# Patient Record
Sex: FEMALE | Race: WHITE
[De-identification: names, ages, dates, MRNs, and addresses within clinical notes are randomized per-mention and may not be internally consistent; named-entity substitution may affect disease eponyms.]

---

## 2014-04-23 VITALS
DIASTOLIC BLOOD PRESSURE: 65 MMHG | SYSTOLIC BLOOD PRESSURE: 150 MMHG | DIASTOLIC BLOOD PRESSURE: 65 MMHG | SYSTOLIC BLOOD PRESSURE: 150 MMHG

## 2017-03-22 ENCOUNTER — HOSPITAL ENCOUNTER (OUTPATIENT)
Dept: HOSPITAL 24 - RAD | Age: 77
End: 2017-03-22
Attending: SPECIALIST
Payer: COMMERCIAL

## 2017-03-22 DIAGNOSIS — Z12.31: Primary | ICD-10-CM

## 2017-03-22 PROCEDURE — 77067 SCR MAMMO BI INCL CAD: CPT

## 2017-03-23 NOTE — MG
HISTORY:  SCREENING 



Comparison: September 23, 2014 and February 10, 2016



FINDINGS: 



Bilateral CC and MLO projections of the right and left breast were obtained.  Scattered fibroglandul
ar tissue is seen to be present without significant interval change.  No suspicious architectural di
stortion, mass or clustered microcalcifications can be observed to suggest malignancy.  No skin thic
kening or nipple retraction is appreciated.   No pathological lymphadenopathy can be identified. Arjun
ign-appearing calcifications are noted within the right and left breast. A biopsy clip is noted on t
he right.

 

IMPRESSION:  NO RADIOGRAPHIC EVIDENCE OF MALIGNANCY.  

 

ACR CATEGORY 2 - benign findings.

 

FOLLOW-UP EXAM 1 YEAR. 



Diagnostic CAD was utilized and reviewed.



* 0 (ZERO) - ASSESSMENT INCOMPLETE; ADDITIONAL IMAGING IS NEEDED. 

* 1/1 (ONE) - NEGATIVE. 

* 2/II (TWO) - BENIGN FINDINGS.

 * 3/III (THREE) - PROBABLY BENIGN FINDING; SHORT INTERVAL FOLLOW-UP

SUGGESTED.

 * 4/IV (FOUR) - SUSPICIOUS ABNORMALITY; BIOPSY SHOULD BE CONSIDERED.

 * 5/V (FIVE) - HIGHLY SUSPICIOUS OF MALIGNANCY; BIOPSY SHOULD BE PERFORMED.

 

A NEGATIVE X-RAY REPORT SHOULD NOT DELAY BIOPSY IF A DOMINANT OR

CLINICALLY SUSPICIOUS MASS IS PRESENT; 4 TO 8 PERCENT OF CANCERS ARE NOT IDENTIFIED BY X-RAY.  A NEG
ATIVE REPORT MAY REINFORCE THE CLINICAL IMPRESSION.  ADENOSIS AND DENSE BREASTS MAY OBSCURE AN UNDER
LYING NEOPLASM.





Reported By:Electronically Signed by KIRA POLLOCK MD at 3/23/2017 3:09:17 PM

## 2017-04-17 ENCOUNTER — HOSPITAL ENCOUNTER (OUTPATIENT)
Dept: HOSPITAL 24 - RAD | Age: 77
End: 2017-04-17
Attending: FAMILY MEDICINE
Payer: COMMERCIAL

## 2017-04-17 DIAGNOSIS — R05: Primary | ICD-10-CM

## 2017-04-17 PROCEDURE — 71020: CPT

## 2017-04-18 NOTE — RAD
HISTORY:  Chronic cough



Study: Chest two-view



Comparison: CT chest June 24, 2016



Findings:



The trachea is midline.  The cardiac silhouette is upper limits normal in size. The left ventricle i
s prominent..  The lungs are clear without focal infiltrate or effusion.  The bony thorax is unremar
kable with the exception of multilevel degenerative disc disease in the thoracic spine..  



IMPRESSION: 

 

1.  No acute cardiopulmonary disease.





Reported By:Electronically Signed by COLLETTE BUTTS MD at 4/18/2017 6:45:08 AM

## 2018-03-27 ENCOUNTER — HOSPITAL ENCOUNTER (OUTPATIENT)
Dept: HOSPITAL 24 - ER | Age: 78
Setting detail: OBSERVATION
LOS: 2 days | Discharge: HOME HEALTH | End: 2018-03-29
Attending: INTERNAL MEDICINE | Admitting: INTERNAL MEDICINE
Payer: COMMERCIAL

## 2018-03-27 VITALS — BODY MASS INDEX: 28 KG/M2

## 2018-03-27 DIAGNOSIS — R55: Primary | ICD-10-CM

## 2018-03-27 DIAGNOSIS — R06.02: ICD-10-CM
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
## 2018-03-27 DIAGNOSIS — E87.1: ICD-10-CM

## 2018-03-27 DIAGNOSIS — R94.31: ICD-10-CM

## 2018-03-27 DIAGNOSIS — R94.4: ICD-10-CM

## 2018-03-27 DIAGNOSIS — R26.89: ICD-10-CM

## 2018-03-27 DIAGNOSIS — R79.1: ICD-10-CM

## 2018-03-27 DIAGNOSIS — M25.552: ICD-10-CM

## 2018-03-27 DIAGNOSIS — I10: ICD-10-CM

## 2018-03-27 DIAGNOSIS — M25.551: ICD-10-CM

## 2018-03-27 DIAGNOSIS — R53.1: ICD-10-CM

## 2018-03-27 LAB
ALBUMIN SERPL BCP-MCNC: 3 G/DL (ref 3.4–5)
ALP SERPL-CCNC: 109 UNITS/L (ref 46–116)
ALT SERPL W P-5'-P-CCNC: 35 UNITS/L (ref 12–78)
AST SERPL W P-5'-P-CCNC: 44 UNITS/L (ref 15–37)
BASOPHILS # BLD AUTO: 0.1 X10^3/UL (ref 0–0.1)
BASOPHILS NFR BLD AUTO: 0.6 % (ref 0.2–1)
BILIRUB UR QL STRIP.AUTO: NEGATIVE
BUN SERPL-MCNC: 17 MG/DL (ref 7–18)
CALCIUM ALBUM COR SERPL-SCNC: (no result) MMOL/L (ref 136–145)
CALCIUM ALBUM COR SERPL-SCNC: 8.9 MG/DL (ref 8.5–10.1)
CALCIUM SERPL-MCNC: 8.1 MG/DL (ref 8.5–10.1)
CHLORIDE SERPL-SCNC: 96 MMOL/L (ref 98–107)
CK MB SERPL-MCNC: 1 NG/ML (ref 0–4)
CK SERPL-CCNC: 105 UNITS/L (ref 26–192)
CKMB %: 1 % (ref ?–4)
CLARITY UR: CLEAR
CO2 SERPL-SCNC: 25 MMOL/L (ref 21–32)
COLOR UR AUTO: YELLOW
CREAT SERPL-MCNC: 1.37 MG/DL (ref 0.55–1.02)
EGFR  BLACK RACES: 48 (ref 60–?)
EOSINOPHIL # BLD AUTO: 0 X10^3/UL (ref 0–0.2)
EOSINOPHIL NFR BLD AUTO: 0.2 % (ref 0.9–2.9)
ERYTHROCYTE [DISTWIDTH] IN BLOOD BY AUTOMATED COUNT: 16.4 % (ref 11.6–16.5)
GLUCOSE UR QL STRIP.AUTO: NEGATIVE
HCT VFR BLD AUTO: 35.4 % (ref 36–47)
HGB BLD-MCNC: 11.5 G/DL (ref 12–16)
KETONES UR QL STRIP.AUTO: NEGATIVE
LEUKOCYTE ESTERASE UR QL STRIP.AUTO: NEGATIVE
LYMPHOCYTES # BLD AUTO: 1.1 X10^3/UL (ref 1.3–2.9)
LYMPHOCYTES NFR BLD AUTO: 7.8 % (ref 21–51)
MAGNESIUM SERPL-MCNC: 1.7 MG/DL (ref 1.7–2.9)
MCH RBC QN AUTO: 26.9 PG (ref 27–34)
MCHC RBC AUTO-ENTMCNC: 32.5 G/DL (ref 33–35)
MCV RBC AUTO: 82.9 FL (ref 80–100)
MONOCYTES # BLD AUTO: 1.1 X10^3/UL (ref 0.3–0.8)
MONOCYTES NFR BLD AUTO: 7.4 % (ref 0–13)
NEUTROPHILS # BLD AUTO: 12.1 X10^3/UL (ref 2.2–4.8)
NEUTROPHILS NFR BLD AUTO: 84 % (ref 42–75)
NITRITE UR QL STRIP.AUTO: NEGATIVE
PH UR STRIP.AUTO: 6 [PH] (ref 5–8)
PLATELET # BLD: 261 X10^3/UL (ref 150–450)
PMV BLD AUTO: 9 FL (ref 7.4–11)
PROT SERPL-MCNC: 7 G/DL (ref 6.4–8.2)
PROT UR QL STRIP.AUTO: NEGATIVE
RBC # BLD AUTO: 4.28 X10^6/UL (ref 3.5–5.4)
RBC # UR STRIP.AUTO: NEGATIVE /UL
SODIUM SERPL-SCNC: 131 MMOL/L (ref 136–145)
SP GR UR STRIP.AUTO: 1.01 (ref 1–1.03)
TROPONIN I SERPL-MCNC: < 0.02 NG/ML (ref 0–1.5)
UROBILINOGEN UR QL STRIP.AUTO: NORMAL
WBC NRBC COR # BLD AUTO: 14.4 X10^3/UL (ref 3.6–10)

## 2018-03-27 PROCEDURE — 82553 CREATINE MB FRACTION: CPT

## 2018-03-27 PROCEDURE — 93005 ELECTROCARDIOGRAM TRACING: CPT

## 2018-03-27 PROCEDURE — 70450 CT HEAD/BRAIN W/O DYE: CPT

## 2018-03-27 PROCEDURE — 73521 X-RAY EXAM HIPS BI 2 VIEWS: CPT

## 2018-03-27 PROCEDURE — 84484 ASSAY OF TROPONIN QUANT: CPT

## 2018-03-27 PROCEDURE — G0378 HOSPITAL OBSERVATION PER HR: HCPCS

## 2018-03-27 PROCEDURE — 85025 COMPLETE CBC W/AUTO DIFF WBC: CPT

## 2018-03-27 PROCEDURE — 71275 CT ANGIOGRAPHY CHEST: CPT

## 2018-03-27 PROCEDURE — 71045 X-RAY EXAM CHEST 1 VIEW: CPT

## 2018-03-27 PROCEDURE — 85378 FIBRIN DEGRADE SEMIQUANT: CPT

## 2018-03-27 PROCEDURE — 96365 THER/PROPH/DIAG IV INF INIT: CPT

## 2018-03-27 PROCEDURE — 81003 URINALYSIS AUTO W/O SCOPE: CPT

## 2018-03-27 PROCEDURE — 80053 COMPREHEN METABOLIC PANEL: CPT

## 2018-03-27 PROCEDURE — 82550 ASSAY OF CK (CPK): CPT

## 2018-03-27 PROCEDURE — 36415 COLL VENOUS BLD VENIPUNCTURE: CPT

## 2018-03-27 PROCEDURE — 83735 ASSAY OF MAGNESIUM: CPT

## 2018-03-27 PROCEDURE — 99284 EMERGENCY DEPT VISIT MOD MDM: CPT

## 2018-03-27 RX ADMIN — SODIUM CHLORIDE SCH MLS/HR: 9 INJECTION, SOLUTION INTRAVENOUS at 23:37

## 2018-03-27 NOTE — CT
HISTORY: Hypertensive status post fall.



Study:  CT brain without contrast



Comparison: None.



Technique:

Multiple axial images of the brain were obtained from the skull base to the vertex without administra
tion of IV contrast.  Dose reduction techniques including Automated Exposure Control (AEC) and adjust
ment of mA and kV were utilized.





Findings: 



Age-related cortical atrophy and chronic small vessel ischemic changes. No acute intraparenchymal hem
orrhage or mass can be identified.  No extra-axial fluid collections are seen.  No alteration in the 
attenuation of the brain parenchyma can be identified to suggest acute or subacute ischemic change.  
The ventricular system is symmetric and nondilated.  The extracranial structures are grossly unremark
able.



IMPRESSION: No obvious acute intracranial pathology. If clinical concern exists for acute ischemia/in
farction, MRI brain is more sensitive.



Reported By:Electronically Signed by INES OJEDA MD at 3/27/2018 7:26:07 PM

## 2018-03-27 NOTE — RAD
HISTORY:  Bilateral hip pain status post fall.



Study: Two views of the bilateral hips.



Comparison: None.



Findings:



Mild osteoarthritis of the bilateral hips. Degenerative changes are also seen of the bilateral SI lucas
nts and visualized lumbar spine. No acute cortical disruption or dislocation can be identified.  No s
ignificant soft tissue swelling or injury can be seen.  



IMPRESSION: No acute osseous abnormality.





Reported By:Electronically Signed by INES OJEDA MD at 3/27/2018 8:57:08 PM

## 2018-03-27 NOTE — RAD
AP chest. Indication: Hypertension with fall. Comparison: 04/17/2017. Findings: There is mild scolios
is of the thoracic spine with curvature of the right. Heart size is mildly enlarged. Chronic intersti
tial lung change are noted without focal airspace opacity, pleural effusion or pneumothorax. No acute
 osseous abnormality. Moderate-size sliding hiatal hernia. 



Impression:

1.Stable cardiomegaly and chronic interstitial lung changes without acute airspace disease or CHF.



2. Moderate-sized hiatal hernia.



Reported By:Electronically Signed by GRACE BALDWIN MD at 3/27/2018 10:06:08 PM

## 2018-03-27 NOTE — DR.GENAD
HPI





- PCP


Primary Care Physician: bryon





- Complaint/Symptoms


Chief Complaint Doctors Comments: Patient presents to the ED via EMS with 

complaint that she fell while at home. Upon of EMS her BP systolic was 46. She 

was given bolus of NS BP in /57. Patient is alert in no acute distress. 

She denies a history of cardiac disease. She complained of bilateral hip pain. 

Family member reporst that patient has a history of dyspnea.


Chief Complaint:: "low bp feeling weak"





- Source


History Provided: Patient





- Mode of Arrival


Mode of Arrival: EMS





- Timing


Onset of Chief Complaint: 03/27/18





PMH





- PMH


Past Medical History: Yes


Past Medical History: Arthritis, Hypertension


Past Surgical History: Yes





- Family History


History of Family Medical Conditions: Yes


Family Medical History: Hypertension





- Social History


Does patient currently use any type of tobacco product: No


Have you used tobacco products in the last 12 months: No


Type of Tobacco Use: None


Does any household member use tobacco: No


Alcohol Use: None


Do you use any recreational Drugs:: No


Lives With: Family


Lives Where: Home





- infectious screening


In the last 2 months have you had wt loss of >10#?: NO


Have you had fever, night sweats or hemotysis?: No


Have you traveled outside the country in the last 6 months?: No


Isolation: Standard





ROS





- Review of Systems


Eyes: No Symptoms Reported


ENTM: No Symptoms Reported


Respiratoy: No Symptoms Reported


Cardiovascular: No Symptoms Reported


Gastrointestinal/Abdominal: No Symptoms Reported


Genitourinary: No Symptoms Reported


Neurological: No Symptoms Reported


Musculoskeletal: No Symptoms Reported


Integumentary: No Symptoms Reported


Hematologic/Lymphatic: No Symptoms Reported


Endocrine: No Symptoms Reported


Psychiatric: No Symptoms Reported


All Other Systems: Reviewed and Negative





PE





- Vital Signs


Vitals: 


 





Temperature                      98.1 F


Pulse Rate [Apical]              92


Pulse Rate                       86


Respiratory Rate                 24


Blood Pressure [Right Arm]       112/57


Blood Pressure                   123/57


O2 Sat by Pulse Oximetry         97











- General


Limitations: No Limitations


General Appearance: Alert, In No Apparent Distress





- Head


Head Exam: Normal Inspection, Atraumatic





- Eyes


Eye exam: Normal Appearance, PERRL, EOMI





- ENT


ENT Exam: Normal Exam


External Ear Exam: Normal External Inspection


TM/Canal Exam: Bilateral Normal


Nose Exam: Normal Nose Exam


Mouth Exam: Normal Inspection


Throat Exam: Normal Inspection





- Neck


Neck Exam: Normal Inspection, Full ROM





- Chest


Chest Inspection: Normal Inspection, Symmetric Chest Wall Rise





- Respiratory


Respiratory Exam: Normal Lung Sounds Bilat


Respiratory Exam: Bilateral Clear to Auscultation





- Cardiovascular


Cardiovascular Exam: Regular Rate, Normal Rhythm





- Abdominal Exam


Abdominal Exam: Normal Inspection, Normal Bowel Sounds


Abdominal Tenderness: negative: RUQ, RLQ, LUQ, LLQ, Epigastrium, Suprapubic, 

Diffuse, Mild, Moderate, Severe, Other





- Extremities


Extremities Exam: Normal Inspection, Full ROM





- Back


Back Exam: Normal Inspection





- Neurologic


Neurological Exam: Alert, Oriented X3, CN II-XII Intact





- Psychiatric


Psychiatric Exam: Normal Affect, Normal Mood





Course





- Reevaluation


1st: Improved





- Consultation


Called: 21:40 (Dr Devi agreed to admit for further evaluation)





ROR





- Labs Reviewed


Laboratory Results Reviewed?: Yes (low sodiuma, D Dimer 612 )


Result Diagrams: 


 03/27/18 18:43





 03/27/18 18:43


Laboratory: 


 











WBC  14.4 X10^3/uL (3.6-10.0)  H  03/27/18  18:43    


 


RBC  4.28 X10^6/uL (3.5-5.4)   03/27/18  18:43    


 


Hgb  11.5 g/dL (12.0-16.0)  L  03/27/18  18:43    


 


Hct  35.4 % (36.0-47.0)  L  03/27/18  18:43    


 


MCV  82.9 fL (80.0-100.0)   03/27/18  18:43    


 


MCH  26.9 pg (27.0-34.0)  L  03/27/18  18:43    


 


MCHC  32.5 g/dL (33.0-35.0)  L  03/27/18  18:43    


 


RDW  16.4 % (11.6-16.5)   03/27/18  18:43    


 


Plt Count  261 X10^3/uL (150.0-450.0)   03/27/18  18:43    


 


MPV  9.0 fL (7.4-11.0)   03/27/18  18:43    


 


Neut % (Auto)  84.0 % (42.0-75.0)  H  03/27/18  18:43    


 


Lymph % (Auto)  7.8 % (21.0-51.0)  L  03/27/18  18:43    


 


Mono % (Auto)  7.4 % (0.0-13.0)   03/27/18  18:43    


 


Eos % (Auto)  0.2 % (0.9-2.9)  L  03/27/18  18:43    


 


Baso % (Auto)  0.6 % (0.2-1.0)   03/27/18  18:43    


 


Neut # (Auto)  12.1 x10^3/uL (2.2-4.8)  H  03/27/18  18:43    


 


Lymph # (Auto)  1.1 X10^3/uL (1.3-2.9)  L  03/27/18  18:43    


 


Mono # (Auto)  1.1 x10^3/uL (0.3-0.8)  H  03/27/18  18:43    


 


Eos # (Auto)  0.0 x10^3/uL (0.0-0.2)   03/27/18  18:43    


 


Baso # (Auto)  0.1 X10^3/uL (0.0-0.1)   03/27/18  18:43    


 


Absolute Nucleated RBC  0.0 /100WBC  03/27/18  18:43    


 


D-Dimer  612 ng/mL (0-400)  H*  03/27/18  18:43    


 


Sodium  131 mmol/L (136-145)  L  03/27/18  18:43    


 


Corrected Sodium  TNP   03/27/18  18:43    


 


Potassium  3.9 mmol/L (3.5-5.1)   03/27/18  18:43    


 


Chloride  96 mmol/L ()  L  03/27/18  18:43    


 


Carbon Dioxide  25.0 mmol/L (21-32)   03/27/18  18:43    


 


BUN  17 mg/dL (7-18)   03/27/18  18:43    


 


Creatinine  1.37 mg/dL (0.55-1.02)  H  03/27/18  18:43    


 


Est GFR (MDRD) Af Amer  48  (>60)  L  03/27/18  18:43    


 


Est GFR (MDRD) Non-Af  39  (>60)  L  03/27/18  18:43    


 


Glucose  105 mg/dL (65-99)  H  03/27/18  18:43    


 


Calcium  8.1 mg/dL (8.5-10.1)  L  03/27/18  18:43    


 


Corrected Calcium  8.9 mg/dL (8.5-10.1)   03/27/18  18:43    


 


Magnesium  1.7 mg/dL (1.7-2.9)   03/27/18  18:43    


 


Total Bilirubin  0.50 mg/dL (0.2-1.0)   03/27/18  18:43    


 


AST  44 Units/L (15-37)  H  03/27/18  18:43    


 


ALT  35 Units/L (12-78)   03/27/18  18:43    


 


Alkaline Phosphatase  109 Units/L ()   03/27/18  18:43    


 


Creatine Kinase  105 Units/L ()   03/27/18  18:43    


 


CK-MB (CK-2)  1.0 ng/mL (0-4.0)   03/27/18  18:43    


 


CK/CKMB % Calc  1.0 % (<4)   03/27/18  18:43    


 


Troponin I  < 0.02 ng/mL (0-1.5)   03/27/18  18:43    


 


Total Protein  7.0 g/dL (6.4-8.2)   03/27/18  18:43    


 


Albumin  3.0 g/dL (3.4-5.0)  L  03/27/18  18:43    


 


Globulin  4.0 g/dL (2.5-4.5)   03/27/18  18:43    


 


Albumin/Globulin Ratio  0.8 Ratio (1.1-2.1)  L  03/27/18  18:43    














- XRAY


XRAY Interpreted by: Radiologist (CT Brain w/o: Age related cortical atrophy 

and chronic small vessel ischemic changes.. No acute intraparenchymal 

hemorrhage or mass can be identified. If clinical concern exists for acute 

ischemia/infarction, MRI brain is more sensitive.)





- Diagnosis


Discharge Problem: 


 Hyponatremia, D-dimer, elevated





Syncope


Qualifiers:


 Syncope type: unspecified Qualified Code(s): R55 - Syncope and collapse





Dyspnea


Qualifiers:


 Dyspnea type: shortness of breath Qualified Code(s): R06.02 - Shortness of 

breath; R06.00 - Dyspnea, unspecified; R06.01 - Orthopnea








- Discharge Plan


Condition: Stable





- Follow ups/Referrals


Follow ups/Referrals: 


RUDY SIDHU [Primary Care Provider] - 3 days





- Instructions

## 2018-03-28 LAB
ALBUMIN SERPL BCP-MCNC: 2.6 G/DL (ref 3.4–5)
ALP SERPL-CCNC: 97 UNITS/L (ref 46–116)
ALT SERPL W P-5'-P-CCNC: 30 UNITS/L (ref 12–78)
AST SERPL W P-5'-P-CCNC: 27 UNITS/L (ref 15–37)
BASOPHILS # BLD AUTO: 0 X10^3/UL (ref 0–0.1)
BASOPHILS NFR BLD AUTO: 0.3 % (ref 0.2–1)
BUN SERPL-MCNC: 11 MG/DL (ref 7–18)
CALCIUM ALBUM COR SERPL-SCNC: (no result) MMOL/L (ref 136–145)
CALCIUM ALBUM COR SERPL-SCNC: 9.1 MG/DL (ref 8.5–10.1)
CALCIUM SERPL-MCNC: 8 MG/DL (ref 8.5–10.1)
CHLORIDE SERPL-SCNC: 100 MMOL/L (ref 98–107)
CO2 SERPL-SCNC: 26.6 MMOL/L (ref 21–32)
CREAT SERPL-MCNC: 0.86 MG/DL (ref 0.55–1.02)
EGFR  BLACK RACES: > 60 (ref 60–?)
EOSINOPHIL # BLD AUTO: 0 X10^3/UL (ref 0–0.2)
EOSINOPHIL NFR BLD AUTO: 0.3 % (ref 0.9–2.9)
ERYTHROCYTE [DISTWIDTH] IN BLOOD BY AUTOMATED COUNT: 16.4 % (ref 11.6–16.5)
HCT VFR BLD AUTO: 32.1 % (ref 36–47)
HGB BLD-MCNC: 10.7 G/DL (ref 12–16)
LYMPHOCYTES # BLD AUTO: 1.7 X10^3/UL (ref 1.3–2.9)
LYMPHOCYTES NFR BLD AUTO: 13.5 % (ref 21–51)
MCH RBC QN AUTO: 27.4 PG (ref 27–34)
MCHC RBC AUTO-ENTMCNC: 33.3 G/DL (ref 33–35)
MCV RBC AUTO: 82.2 FL (ref 80–100)
MONOCYTES # BLD AUTO: 0.9 X10^3/UL (ref 0.3–0.8)
MONOCYTES NFR BLD AUTO: 7.2 % (ref 0–13)
NEUTROPHILS # BLD AUTO: 9.7 X10^3/UL (ref 2.2–4.8)
NEUTROPHILS NFR BLD AUTO: 78.7 % (ref 42–75)
PLATELET # BLD: 243 X10^3/UL (ref 150–450)
PMV BLD AUTO: 9.3 FL (ref 7.4–11)
PROT SERPL-MCNC: 6.4 G/DL (ref 6.4–8.2)
RBC # BLD AUTO: 3.9 X10^6/UL (ref 3.5–5.4)
SODIUM SERPL-SCNC: 135 MMOL/L (ref 136–145)
WBC NRBC COR # BLD AUTO: 12.3 X10^3/UL (ref 3.6–10)

## 2018-03-28 RX ADMIN — GABAPENTIN SCH MG: 300 CAPSULE ORAL at 15:13

## 2018-03-28 RX ADMIN — CARVEDILOL SCH MG: 6.25 TABLET, FILM COATED ORAL at 09:27

## 2018-03-28 RX ADMIN — CELECOXIB SCH MG: 100 CAPSULE ORAL at 09:27

## 2018-03-28 RX ADMIN — SODIUM CHLORIDE SCH: 9 INJECTION, SOLUTION INTRAMUSCULAR; INTRAVENOUS; SUBCUTANEOUS at 06:06

## 2018-03-28 RX ADMIN — GABAPENTIN SCH MG: 300 CAPSULE ORAL at 06:33

## 2018-03-28 RX ADMIN — LANSOPRAZOLE SCH MG: 30 CAPSULE, DELAYED RELEASE ORAL at 07:45

## 2018-03-28 RX ADMIN — DOCUSATE SODIUM SCH MG: 100 CAPSULE, LIQUID FILLED ORAL at 06:33

## 2018-03-28 RX ADMIN — MAGNESIUM HYDROXIDE SCH ML: 400 SUSPENSION ORAL at 06:33

## 2018-03-28 RX ADMIN — SODIUM CHLORIDE SCH ML: 9 INJECTION, SOLUTION INTRAMUSCULAR; INTRAVENOUS; SUBCUTANEOUS at 22:00

## 2018-03-28 RX ADMIN — CELECOXIB SCH MG: 100 CAPSULE ORAL at 21:06

## 2018-03-28 RX ADMIN — LOSARTAN POTASSIUM AND HYDROCHLOROTHIAZIDE SCH TAB: 50; 12.5 TABLET, FILM COATED ORAL at 09:27

## 2018-03-28 RX ADMIN — SODIUM CHLORIDE SCH MLS/HR: 9 INJECTION, SOLUTION INTRAVENOUS at 12:48

## 2018-03-28 RX ADMIN — GABAPENTIN SCH MG: 300 CAPSULE ORAL at 21:06

## 2018-03-28 RX ADMIN — SODIUM CHLORIDE SCH: 9 INJECTION, SOLUTION INTRAMUSCULAR; INTRAVENOUS; SUBCUTANEOUS at 15:13

## 2018-03-28 RX ADMIN — CARVEDILOL SCH MG: 6.25 TABLET, FILM COATED ORAL at 21:05

## 2018-03-28 RX ADMIN — MAGNESIUM HYDROXIDE SCH: 400 SUSPENSION ORAL at 21:29

## 2018-03-28 RX ADMIN — DOCUSATE SODIUM SCH: 100 CAPSULE, LIQUID FILLED ORAL at 21:29

## 2018-03-28 RX ADMIN — LANSOPRAZOLE SCH MG: 30 CAPSULE, DELAYED RELEASE ORAL at 21:06

## 2018-03-28 NOTE — CT
HISTORY: Elevated D-dimer. Rule out PE.



Study: CT chest with contrast



Comparison: Chest x-ray dated March 27, 2018.



Technique: Multiple axial images of the chest were obtained from the thoracic inlet to the upper abdo
men after the administration of IV contrast. MIP images were obtained. Dose reduction techniques incl
uding Automated Exposure Control (AEC) and adjustment of mA and kV were utilized.



Findings:



The mediastinum does not demonstrate significant pathological lymphadenopathy.  There is no paracardi
al effusion observed.  The thoracic aorta is normal in its contour without evidence for aneurysmal di
latation.  The central pulmonary arterial system does not demonstrate central filling defects to sugg
est pulmonary emboli. Cardiomegaly. Stents are seen within the left anterior descending coronary tonya
ry.



Biapical scarring. Bibasilar scarring versus atelectasis. No obvious pulmonary nodule, mass, pleural 
effusion, focal consolidation, or pneumothorax. Moderate-sized hiatal hernia. Remaining upper abdomin
al structures are unremarkable. Moderate to severe degenerative changes of the spine. No aggressive o
sseous lesions.



IMPRESSION:

 

1.  No CT evidence of acute thoracic pathology or pulmonary embolus.



2. Other chronic findings as above.





Reported By:Electronically Signed by INES OJEDA MD at 3/28/2018 3:55:08 PM

## 2018-03-28 NOTE — DR.H&P
H&P





- History & Physical for Day of:


H&P Date: 03/27/18





- Chief Complaint


Chief Complaint: FALL, GENERALIZED WEAKNESS, SHORT OF BREATH





- Allergies


Allergies/Adverse Reactions: 


 Allergies











Allergy/AdvReac Type Severity Reaction Status Date / Time


 


codeine Allergy   Verified 03/27/18 21:24














- History of Present Illness


History of Present Illness:  is a 78 year old patient of  

who presented to the emergency room via EMS with reports of a fall at home.  

Patient reports generalized weakness and dizziness throughout the day. EMS 

reports that patients systolic blood pressure was in the 40s on their 

arrival. Patient states her blood pressure has been decreased recently. She 

also reports bilateral hip pain following the fall. Medical History includes: 

Cataracts, Hypertension, IBS, Constipation, Muscle Weakness, Arthritis, DDD, 

Depression, and Dyspnea.  On arrival to the emergency room, vitals were 98.1, 86

, 16, 99% 2L NC, 123/57. Labs were obtained. Abnormal Labs include the following

: WBC 14.4, Hgb 11.5, Hct 35.4, MCH 26.9, MCHC 32.5, D-Dimer 612, Sodium 131, 

Chloride 96, Creatinine 1.37, GFR af 48, GFR non 39, Glucose 105, Calcium 8.1, 

AST 44, Albumin 3.0, A/G Ratio 0.8. EKG revealed: Sinus Rhythm, Rate=86. Hip X-

Ray reports: No acute osseous abnormality. A Chest X-Ray was obtained and 

reported: Stable cardiomegaly and chronic interstitial lung changes without 

acute airspace disease or CHF.  Moderate-sized hiatal hernia.  Brain CT reported

: No obvious acute intracranial pathology.  If clinical concern exists for 

acute ischemia/infarction, MRI brain is more sensitive. Patient admitted to the 

hospital as observation for a syncopal episode, hyponatremia, and to rule out a 

pulmonary embolism. We plan to obtain a chest CT with contrast in the morning 

and will follow up with am labs.





- Past Medical History


Past Medical History: Arthritis, Hypertension





- Past Surgical History


Surgical History: Hysterectomy, Tonsillectomy





- Family History


Family Medical History: Hypertension





- Social History


Does patient currently use any type of tobacco product: No


Have you used tobacco products in the last 12 months: No


Type of Tobacco Use: None


Does any household member use tobacco: No


Alcohol Use: None


Drug Use: None





- Medications


Home Medications: 


 





Alprazolam [Xanax] 0.5 mg PO HS PRN 03/27/18 [History Confirmed 03/28/18]


Carvedilol [Coreg Tab 6.25 mg] 6.25 mg PO BID 03/27/18 [History Confirmed 03/28/ 18]


Celecoxib 200 mg PO BID 03/27/18 [History Confirmed 03/28/18]


Gabapentin [Neurontin Cap 300 mg] 300 mg PO TID 03/27/18 [History Confirmed 03/ 28/18]


Lansoprazole [Lansoprazole 30 mg] 30 mg PO BID 03/27/18 [History Confirmed 03/28 /18]


Losartan/Hydrochlorothiazide [Losartan-Hctz 100-25 mg Tab] 1 each PO DAILY 03/27 /18 [History Confirmed 03/28/18]


Amoxicillin/Potassium Clav [Amox-Clav 875-125 mg Tablet] 1 tab PO BID 03/28/18 [

History Confirmed 03/28/18]


Atorvastatin Calcium [Atorvastatin Calcium] 1 tab PO DAILY 03/28/18 [History 

Confirmed 03/28/18]


Estradiol [Estradiol] 1 tab PO DAILY 03/28/18 [History Confirmed 03/28/18]


Tapentadol HCl [Nucynta] 1 tab PO Q6H 03/28/18 [History Confirmed 03/28/18]











- Physical Exam


Vital Signs: 


 





Temperature                      97.3 F


Pulse Rate [Apical]              77


Pulse Rate                       86


Respiratory Rate                 18


Blood Pressure [Right Arm]       127/57


Blood Pressure                   123/57


O2 Sat by Pulse Oximetry         99

## 2018-03-29 VITALS — DIASTOLIC BLOOD PRESSURE: 51 MMHG | SYSTOLIC BLOOD PRESSURE: 104 MMHG

## 2018-03-29 LAB
ALBUMIN SERPL BCP-MCNC: 2.3 G/DL (ref 3.4–5)
ALP SERPL-CCNC: 92 UNITS/L (ref 46–116)
ALT SERPL W P-5'-P-CCNC: 27 UNITS/L (ref 12–78)
AST SERPL W P-5'-P-CCNC: 17 UNITS/L (ref 15–37)
BASOPHILS # BLD AUTO: 0 X10^3/UL (ref 0–0.1)
BASOPHILS NFR BLD AUTO: 0.4 % (ref 0.2–1)
BUN SERPL-MCNC: 10 MG/DL (ref 7–18)
CALCIUM ALBUM COR SERPL-SCNC: (no result) MMOL/L (ref 136–145)
CALCIUM ALBUM COR SERPL-SCNC: 9.2 MG/DL (ref 8.5–10.1)
CALCIUM SERPL-MCNC: 7.8 MG/DL (ref 8.5–10.1)
CHLORIDE SERPL-SCNC: 103 MMOL/L (ref 98–107)
CO2 SERPL-SCNC: 24.2 MMOL/L (ref 21–32)
CREAT SERPL-MCNC: 0.72 MG/DL (ref 0.55–1.02)
EGFR  BLACK RACES: > 60 (ref 60–?)
EOSINOPHIL # BLD AUTO: 0.2 X10^3/UL (ref 0–0.2)
EOSINOPHIL NFR BLD AUTO: 1.6 % (ref 0.9–2.9)
ERYTHROCYTE [DISTWIDTH] IN BLOOD BY AUTOMATED COUNT: 16.9 % (ref 11.6–16.5)
HCT VFR BLD AUTO: 29.9 % (ref 36–47)
HGB BLD-MCNC: 10 G/DL (ref 12–16)
LYMPHOCYTES # BLD AUTO: 1.4 X10^3/UL (ref 1.3–2.9)
LYMPHOCYTES NFR BLD AUTO: 13.5 % (ref 21–51)
MCH RBC QN AUTO: 27.8 PG (ref 27–34)
MCHC RBC AUTO-ENTMCNC: 33.6 G/DL (ref 33–35)
MCV RBC AUTO: 82.7 FL (ref 80–100)
MONOCYTES # BLD AUTO: 0.8 X10^3/UL (ref 0.3–0.8)
MONOCYTES NFR BLD AUTO: 8 % (ref 0–13)
NEUTROPHILS # BLD AUTO: 7.7 X10^3/UL (ref 2.2–4.8)
NEUTROPHILS NFR BLD AUTO: 76.5 % (ref 42–75)
PLATELET # BLD: 227 X10^3/UL (ref 150–450)
PMV BLD AUTO: 9.2 FL (ref 7.4–11)
PROT SERPL-MCNC: 5.9 G/DL (ref 6.4–8.2)
RBC # BLD AUTO: 3.61 X10^6/UL (ref 3.5–5.4)
SODIUM SERPL-SCNC: 136 MMOL/L (ref 136–145)
WBC NRBC COR # BLD AUTO: 10.1 X10^3/UL (ref 3.6–10)

## 2018-03-29 RX ADMIN — GABAPENTIN SCH MG: 300 CAPSULE ORAL at 06:21

## 2018-03-29 RX ADMIN — LOSARTAN POTASSIUM AND HYDROCHLOROTHIAZIDE SCH TAB: 50; 12.5 TABLET, FILM COATED ORAL at 09:38

## 2018-03-29 RX ADMIN — CARVEDILOL SCH MG: 6.25 TABLET, FILM COATED ORAL at 09:38

## 2018-03-29 RX ADMIN — LANSOPRAZOLE SCH MG: 30 CAPSULE, DELAYED RELEASE ORAL at 09:38

## 2018-03-29 RX ADMIN — CELECOXIB SCH MG: 100 CAPSULE ORAL at 09:38

## 2018-03-29 RX ADMIN — SODIUM CHLORIDE SCH: 9 INJECTION, SOLUTION INTRAMUSCULAR; INTRAVENOUS; SUBCUTANEOUS at 06:21

## 2018-03-29 RX ADMIN — SODIUM CHLORIDE SCH MLS/HR: 9 INJECTION, SOLUTION INTRAVENOUS at 02:40

## 2018-03-29 NOTE — RAD
HISTORY:  Shortness of breath



Study:  Chest AP portable



Comparison: 03/27/2018 plain film and CTA chest







Findings:



The heart remains enlarged. No congestive heart failure is noted. The lungs are mildly hypo inflated 
but free of acute infiltrates. No pleural effusions are identified. The bony thorax is unremarkable. 
The bony thorax appears intact.



IMPRESSION:

 

Moderate cardiomegaly without congestive heart failure



Lungs hypo inflated but clear







Reported By:Electronically Signed by COLLETTE BUTTS MD at 3/29/2018 6:56:13 AM